# Patient Record
Sex: MALE | Race: BLACK OR AFRICAN AMERICAN | NOT HISPANIC OR LATINO | Employment: UNEMPLOYED | RURAL
[De-identification: names, ages, dates, MRNs, and addresses within clinical notes are randomized per-mention and may not be internally consistent; named-entity substitution may affect disease eponyms.]

---

## 2024-03-10 ENCOUNTER — HOSPITAL ENCOUNTER (OUTPATIENT)
Facility: HOSPITAL | Age: 29
Discharge: HOME | End: 2024-03-10
Attending: EMERGENCY MEDICINE | Admitting: ORTHOPAEDIC SURGERY
Payer: COMMERCIAL

## 2024-03-10 ENCOUNTER — ANESTHESIA EVENT (OUTPATIENT)
Dept: SURGERY | Facility: HOSPITAL | Age: 29
End: 2024-03-10
Payer: COMMERCIAL

## 2024-03-10 ENCOUNTER — ANESTHESIA (OUTPATIENT)
Dept: SURGERY | Facility: HOSPITAL | Age: 29
End: 2024-03-10
Payer: COMMERCIAL

## 2024-03-10 VITALS
BODY MASS INDEX: 29.8 KG/M2 | WEIGHT: 220 LBS | OXYGEN SATURATION: 99 % | RESPIRATION RATE: 18 BRPM | HEIGHT: 72 IN | DIASTOLIC BLOOD PRESSURE: 79 MMHG | SYSTOLIC BLOOD PRESSURE: 173 MMHG | TEMPERATURE: 97 F | HEART RATE: 72 BPM

## 2024-03-10 DIAGNOSIS — S62.314A CLOSED DISPLACED FRACTURE OF BASE OF FOURTH METACARPAL BONE OF RIGHT HAND, INITIAL ENCOUNTER: Primary | ICD-10-CM

## 2024-03-10 DIAGNOSIS — S63.266A CLOSED DISLOCATION OF FIFTH METACARPAL BONE OF RIGHT HAND: ICD-10-CM

## 2024-03-10 PROCEDURE — 96374 THER/PROPH/DIAG INJ IV PUSH: CPT

## 2024-03-10 PROCEDURE — 37000008 HC ANESTHESIA 1ST 15 MINUTES: Performed by: ORTHOPAEDIC SURGERY

## 2024-03-10 PROCEDURE — 99285 EMERGENCY DEPT VISIT HI MDM: CPT | Mod: 25

## 2024-03-10 PROCEDURE — 63600175 PHARM REV CODE 636 W HCPCS: Performed by: ANESTHESIOLOGY

## 2024-03-10 PROCEDURE — 36000706: Performed by: ORTHOPAEDIC SURGERY

## 2024-03-10 PROCEDURE — 71000033 HC RECOVERY, INTIAL HOUR: Performed by: ORTHOPAEDIC SURGERY

## 2024-03-10 PROCEDURE — D9220A PRA ANESTHESIA: Mod: ,,, | Performed by: ANESTHESIOLOGY

## 2024-03-10 PROCEDURE — 99222 1ST HOSP IP/OBS MODERATE 55: CPT | Mod: 57,,, | Performed by: ORTHOPAEDIC SURGERY

## 2024-03-10 PROCEDURE — C1713 ANCHOR/SCREW BN/BN,TIS/BN: HCPCS | Performed by: ORTHOPAEDIC SURGERY

## 2024-03-10 PROCEDURE — 36000707: Performed by: ORTHOPAEDIC SURGERY

## 2024-03-10 PROCEDURE — 26608 TREAT METACARPAL FRACTURE: CPT | Mod: RT,,, | Performed by: ORTHOPAEDIC SURGERY

## 2024-03-10 PROCEDURE — 25000003 PHARM REV CODE 250: Performed by: ANESTHESIOLOGY

## 2024-03-10 PROCEDURE — 37000009 HC ANESTHESIA EA ADD 15 MINS: Performed by: ORTHOPAEDIC SURGERY

## 2024-03-10 PROCEDURE — 99285 EMERGENCY DEPT VISIT HI MDM: CPT | Mod: ,,, | Performed by: EMERGENCY MEDICINE

## 2024-03-10 PROCEDURE — 96375 TX/PRO/DX INJ NEW DRUG ADDON: CPT

## 2024-03-10 PROCEDURE — 63600175 PHARM REV CODE 636 W HCPCS: Performed by: EMERGENCY MEDICINE

## 2024-03-10 RX ORDER — SODIUM CHLORIDE 0.9 % (FLUSH) 0.9 %
2 SYRINGE (ML) INJECTION EVERY 8 HOURS PRN
Status: DISCONTINUED | OUTPATIENT
Start: 2024-03-10 | End: 2024-03-10 | Stop reason: HOSPADM

## 2024-03-10 RX ORDER — ONDANSETRON HYDROCHLORIDE 2 MG/ML
4 INJECTION, SOLUTION INTRAVENOUS DAILY PRN
Status: DISCONTINUED | OUTPATIENT
Start: 2024-03-10 | End: 2024-03-10 | Stop reason: HOSPADM

## 2024-03-10 RX ORDER — ATROPINE SULFATE 0.4 MG/ML
INJECTION, SOLUTION ENDOTRACHEAL; INTRAMEDULLARY; INTRAMUSCULAR; INTRAVENOUS; SUBCUTANEOUS
Status: DISCONTINUED | OUTPATIENT
Start: 2024-03-10 | End: 2024-03-10

## 2024-03-10 RX ORDER — HYDROMORPHONE HYDROCHLORIDE 2 MG/ML
1 INJECTION, SOLUTION INTRAMUSCULAR; INTRAVENOUS; SUBCUTANEOUS
Status: COMPLETED | OUTPATIENT
Start: 2024-03-10 | End: 2024-03-10

## 2024-03-10 RX ORDER — HYDROCHLOROTHIAZIDE 25 MG/1
25 TABLET ORAL DAILY
COMMUNITY

## 2024-03-10 RX ORDER — PROPOFOL 10 MG/ML
VIAL (ML) INTRAVENOUS
Status: DISCONTINUED | OUTPATIENT
Start: 2024-03-10 | End: 2024-03-10

## 2024-03-10 RX ORDER — ONDANSETRON 4 MG/1
8 TABLET, ORALLY DISINTEGRATING ORAL EVERY 8 HOURS PRN
Status: DISCONTINUED | OUTPATIENT
Start: 2024-03-10 | End: 2024-03-10 | Stop reason: HOSPADM

## 2024-03-10 RX ORDER — OXYCODONE HYDROCHLORIDE 5 MG/1
5 TABLET ORAL
Status: DISCONTINUED | OUTPATIENT
Start: 2024-03-10 | End: 2024-03-10 | Stop reason: HOSPADM

## 2024-03-10 RX ORDER — DIPHENHYDRAMINE HYDROCHLORIDE 50 MG/ML
25 INJECTION INTRAMUSCULAR; INTRAVENOUS EVERY 6 HOURS PRN
Status: DISCONTINUED | OUTPATIENT
Start: 2024-03-10 | End: 2024-03-10 | Stop reason: HOSPADM

## 2024-03-10 RX ORDER — MEPERIDINE HYDROCHLORIDE 25 MG/ML
25 INJECTION INTRAMUSCULAR; INTRAVENOUS; SUBCUTANEOUS EVERY 10 MIN PRN
Status: DISCONTINUED | OUTPATIENT
Start: 2024-03-10 | End: 2024-03-10 | Stop reason: HOSPADM

## 2024-03-10 RX ORDER — LIDOCAINE HYDROCHLORIDE 20 MG/ML
INJECTION, SOLUTION EPIDURAL; INFILTRATION; INTRACAUDAL; PERINEURAL
Status: DISCONTINUED | OUTPATIENT
Start: 2024-03-10 | End: 2024-03-10

## 2024-03-10 RX ORDER — FLUCONAZOLE 150 MG/1
150 TABLET ORAL DAILY
COMMUNITY

## 2024-03-10 RX ORDER — FENTANYL CITRATE 50 UG/ML
INJECTION, SOLUTION INTRAMUSCULAR; INTRAVENOUS
Status: DISCONTINUED | OUTPATIENT
Start: 2024-03-10 | End: 2024-03-10

## 2024-03-10 RX ORDER — HYDROMORPHONE HYDROCHLORIDE 2 MG/ML
0.5 INJECTION, SOLUTION INTRAMUSCULAR; INTRAVENOUS; SUBCUTANEOUS EVERY 5 MIN PRN
Status: DISCONTINUED | OUTPATIENT
Start: 2024-03-10 | End: 2024-03-10 | Stop reason: HOSPADM

## 2024-03-10 RX ORDER — OXYCODONE HYDROCHLORIDE 5 MG/1
5 TABLET ORAL EVERY 4 HOURS PRN
Status: DISCONTINUED | OUTPATIENT
Start: 2024-03-10 | End: 2024-03-10 | Stop reason: HOSPADM

## 2024-03-10 RX ORDER — CEFAZOLIN SODIUM 1 G/3ML
INJECTION, POWDER, FOR SOLUTION INTRAMUSCULAR; INTRAVENOUS
Status: DISCONTINUED | OUTPATIENT
Start: 2024-03-10 | End: 2024-03-10

## 2024-03-10 RX ORDER — SODIUM CHLORIDE, SODIUM LACTATE, POTASSIUM CHLORIDE, CALCIUM CHLORIDE 600; 310; 30; 20 MG/100ML; MG/100ML; MG/100ML; MG/100ML
125 INJECTION, SOLUTION INTRAVENOUS CONTINUOUS
Status: DISCONTINUED | OUTPATIENT
Start: 2024-03-10 | End: 2024-03-10 | Stop reason: HOSPADM

## 2024-03-10 RX ORDER — ONDANSETRON HYDROCHLORIDE 2 MG/ML
4 INJECTION, SOLUTION INTRAVENOUS
Status: COMPLETED | OUTPATIENT
Start: 2024-03-10 | End: 2024-03-10

## 2024-03-10 RX ORDER — LOSARTAN POTASSIUM 100 MG/1
100 TABLET ORAL DAILY
COMMUNITY

## 2024-03-10 RX ORDER — AMLODIPINE BESYLATE 10 MG/1
10 TABLET ORAL DAILY
COMMUNITY

## 2024-03-10 RX ORDER — MORPHINE SULFATE 10 MG/ML
4 INJECTION INTRAMUSCULAR; INTRAVENOUS; SUBCUTANEOUS EVERY 5 MIN PRN
Status: DISCONTINUED | OUTPATIENT
Start: 2024-03-10 | End: 2024-03-10 | Stop reason: HOSPADM

## 2024-03-10 RX ORDER — IBUPROFEN 600 MG/1
600 TABLET ORAL 3 TIMES DAILY
Qty: 60 TABLET | Refills: 2 | Status: SHIPPED | OUTPATIENT
Start: 2024-03-10

## 2024-03-10 RX ORDER — MUPIROCIN 20 MG/G
OINTMENT TOPICAL 2 TIMES DAILY
Status: DISCONTINUED | OUTPATIENT
Start: 2024-03-10 | End: 2024-03-10 | Stop reason: HOSPADM

## 2024-03-10 RX ORDER — HYDROMORPHONE HYDROCHLORIDE 2 MG/ML
1 INJECTION, SOLUTION INTRAMUSCULAR; INTRAVENOUS; SUBCUTANEOUS EVERY 4 HOURS PRN
Status: DISCONTINUED | OUTPATIENT
Start: 2024-03-10 | End: 2024-03-10 | Stop reason: HOSPADM

## 2024-03-10 RX ADMIN — ATROPINE SULFATE 0.2 MG: 0.4 INJECTION, SOLUTION INTRAMUSCULAR; INTRAVENOUS; SUBCUTANEOUS at 05:03

## 2024-03-10 RX ADMIN — ONDANSETRON 4 MG: 2 INJECTION INTRAMUSCULAR; INTRAVENOUS at 03:03

## 2024-03-10 RX ADMIN — PROPOFOL 150 MG: 10 INJECTION, EMULSION INTRAVENOUS at 05:03

## 2024-03-10 RX ADMIN — FENTANYL CITRATE 100 MCG: 50 INJECTION INTRAMUSCULAR; INTRAVENOUS at 05:03

## 2024-03-10 RX ADMIN — HYDROMORPHONE HYDROCHLORIDE 1 MG: 2 INJECTION, SOLUTION INTRAMUSCULAR; INTRAVENOUS; SUBCUTANEOUS at 03:03

## 2024-03-10 RX ADMIN — LIDOCAINE HYDROCHLORIDE 40 MG: 20 INJECTION, SOLUTION INTRAVENOUS at 05:03

## 2024-03-10 RX ADMIN — CEFAZOLIN 2 G: 1 INJECTION, POWDER, FOR SOLUTION INTRAMUSCULAR; INTRAVENOUS; PARENTERAL at 05:03

## 2024-03-10 NOTE — ED PROVIDER NOTES
Encounter Date: 3/10/2024    SCRIBE #1 NOTE: I, Tammy Bryant, am scribing for, and in the presence of,  Caesar Sanches MD. I have scribed the entire note.       History     Chief Complaint   Patient presents with    Hand Injury     28 year old male presents to the ED via [facility] complaining of hand injury. Patient says that he punched a window with his right hand due to being mad at his roommate. He now reports pain to his right hand with swelling and tenderness. No other complaints at this time.     The history is provided by the patient. No  was used.     Review of patient's allergies indicates:  No Known Allergies  Past Medical History:   Diagnosis Date    Hypertension      Past Surgical History:   Procedure Laterality Date    CLOSED REDUCTION OF FRACTURE OF HAND WITH PERCUTANEOUS PINNING Right 3/10/2024    Procedure: CLOSED REDUCTION, FRACTURE, HAND, WITH PERCUTANEOUS PINNING;  Surgeon: Omar Valerio MD;  Location: [facility];  Service: Orthopedics;  Laterality: Right;     History reviewed. No pertinent family history.     Review of Systems   Musculoskeletal:         Right hand pain.    All other systems reviewed and are negative.      Physical Exam     Initial Vitals [03/10/24 1349]   BP Pulse Resp Temp SpO2   (!) 168/98 64 16 97.8 °F (36.6 °C) 100 %      MAP       --         Physical Exam    Nursing note and vitals reviewed.  Eyes: Conjunctivae are normal. Pupils are equal, round, and reactive to light.   Neck:   Normal range of motion.  Cardiovascular:  Regular rhythm and normal heart sounds.           Pulmonary/Chest: Breath sounds normal.   Musculoskeletal:         General: Tenderness present.      Cervical back: Normal range of motion.      Comments: Deformity of dorsum of right hand with swelling and tenderness.      Neurological: He is alert and oriented to person, place, and time. He has normal strength and normal reflexes.         ED Course   Procedures  Labs Reviewed - No  data to display       Imaging Results              X-Ray Hand 3 View Right (Final result)  Result time 03/10/24 18:49:54      Final result by Kenneth King DO (03/10/24 18:49:54)                   Impression:      Anatomic alignment of the right hand. Metallic pins extend through the 3rd, 4th and 5th metacarpal bases.      Electronically signed by: Kenneth King  Date:    03/10/2024  Time:    18:49               Narrative:    EXAMINATION:  XR HAND COMPLETE 3 VIEW RIGHT    CLINICAL HISTORY:  Postoperative    TECHNIQUE:  XR HAND COMPLETE 3 VIEW RIGHT    COMPARISON:  3/10/24    FINDINGS:  Anatomic alignment of the right hand.  Metallic pins extend through the 3rd, 4th and 5th metacarpal bases.                                       X-Ray Hand 3 view Right (Edited Result - FINAL)  Result time 03/10/24 14:09:41      Addendum (preliminary) 1 of 1 by Kenneth King DO (03/10/24 14:09:41)      Posterior dislocation of the proximal aspect of the fourth and fifth metacarpals.      Electronically signed by: Kenneth King  Date:    03/10/2024  Time:    14:09                 Final result by Kenneth King DO (03/10/24 14:03:00)                   Impression:      Small bony fragment inferior to the 4th or 5th ray, probably avulsion fragment.    Posterior dislocation of the proximal aspects of the 4th and 5th metatarsals      Electronically signed by: Kenneth King  Date:    03/10/2024  Time:    14:03               Narrative:    EXAMINATION:  XR HAND COMPLETE 3 VIEW RIGHT    CLINICAL HISTORY:  injury;    TECHNIQUE:  XR HAND COMPLETE 3 VIEW RIGHT    COMPARISON:  None    FINDINGS:  Small bony fragment inferior to the 4th or 5th ray, probably avulsion fragment.    Posterior dislocation of the proximal aspects of the 4th and 5th metatarsals    No radiopaque foreign bodies.    Soft tissue swelling at the dorsum of the hand.                                       Medications   ondansetron injection 4 mg (4 mg  Intravenous Given 3/10/24 1506)   HYDROmorphone (PF) injection 1 mg (1 mg Intravenous Given 3/10/24 1506)     Medical Decision Making  HAND INJURY\    DDX:  FRACTURE VS DISLOCATION VS BOTH    SURGERY    Amount and/or Complexity of Data Reviewed  Radiology: ordered. Decision-making details documented in ED Course.    Risk  Prescription drug management.              Attending Attestation:           Physician Attestation for Scribe:  Physician Attestation Statement for Scribe #1: I, Caesar Sanches MD, reviewed documentation, as scribed by Tammy Hurtado in my presence, and it is both accurate and complete.                                    Clinical Impression:  Final diagnoses:  [S63.266A] Closed dislocation of fifth metacarpal bone of right hand          ED Disposition Condition    Admit Stable                Caesar Sanches MD  03/26/24 0942

## 2024-03-10 NOTE — ANESTHESIA PREPROCEDURE EVALUATION
03/10/2024  Arnie Vines is a 28 y.o., male.      Pre-op Assessment    I have reviewed the Patient Summary Reports.     I have reviewed the Nursing Notes. I have reviewed the NPO Status.   I have reviewed the Medications.     Review of Systems  Anesthesia Hx:  No problems with previous Anesthesia                Social:  Non-Smoker, No Alcohol Use       Hematology/Oncology:  Hematology Normal   Oncology Normal                                   EENT/Dental:  EENT/Dental Normal           Cardiovascular:     Hypertension                                        Pulmonary:  Pulmonary Normal                       Renal/:  Renal/ Normal                 Hepatic/GI:  Hepatic/GI Normal                 Musculoskeletal:  Musculoskeletal Normal    Rt metacarpal  fx            Neurological:  Neurology Normal                                      Endocrine:  Endocrine Normal            Dermatological:  Skin Normal    Psych:  Psychiatric Normal                    Physical Exam  General: Well nourished    Airway:  Mallampati: II / II  Mouth Opening: Normal  TM Distance: > 6 cm  Tongue: Normal  Neck ROM: Normal ROM    Chest/Lungs:  Clear to auscultation, Normal Respiratory Rate    Heart:  Rate: Normal  Rhythm: Regular Rhythm        Anesthesia Plan  Type of Anesthesia, risks & benefits discussed:    Anesthesia Type: Gen Supraglottic Airway  Intra-op Monitoring Plan: Standard ASA Monitors  Post Op Pain Control Plan: multimodal analgesia  Induction:  IV  Informed Consent: Informed consent signed with the Patient and all parties understand the risks and agree with anesthesia plan.  All questions answered. Patient consented to blood products? Yes  ASA Score: 2 Emergent  Day of Surgery Review of History & Physical: H&P Update referred to the surgeon/provider.I have interviewed and examined the patient. I have reviewed the  patient's H&P dated: There are no significant changes.     Ready For Surgery From Anesthesia Perspective.     .

## 2024-03-10 NOTE — PLAN OF CARE
1810 pt to pacu pt resp reg and non labored pt sleepy pt awakens easily pt sao2 100% on ra pt with cast intact to r hand fingers with good color cap refill less than 3 sec will monitor     1820 pt vs stable pt resting well pt voices no complaints pt moving fingers well to r hand pt states no numbness or tingling    1840 pt vs stable pt resting well pt sao2 100% on ra pt cast intact to r hand pt pain level 0 at present orders to release to room per md ko present for transport    1845 pt released to evelyne hayes rn b/p 173/79 pulse 72 resp 18 sao2 99% on ra temp 97.4 oral  Pt awake and alert voices no complaints

## 2024-03-10 NOTE — OP NOTE
[facility] - Orthopedic Periop Services  Operative Note    Surgery Date: 3/10/2024      Surgeon(s) and Role:     * Omar Valerio MD - Primary    Assistant: Hardik Mireles    Pre-op Diagnosis:   Closed displaced fracture of base of fourth metacarpal bone of right hand, initial encounter [S62.314A]     Post-op Diagnosis:  Post-Op Diagnosis Codes:     * Closed displaced fracture of base of fourth metacarpal bone of right hand, initial encounter [S62.314A]     Procedure:  Closed reduction and percutaneous pinning of right 5th and 4th carpometacarpal joints  Anesthesia:  lma    EBL:  1CC    Implants:   Implant Name Type Inv. Item Serial No.  Lot No. LRB No. Used Action   k-wire      Right 1 Implanted       Tourniquet time: 0 mins    Complication:   none    Procedure: The patient was taken to the operating room and placedsupine.  Anesthesia was administered and pre-operative antibiotics were given.  A timeout was performed.  Patient was positioned appropriately and prepped and draped in the usual sterile fashion.    AFTER THE PATIENT WAS anesthetized, I then reduced the 4th and 5th carpometacarpal fracture dislocation.  This was confirmed under fluoroscopic imaging.  I then utilized 064 mm K-wires and from an ulnar to radial direction I inserted these through the base of the 5th metacarpal into the 4th metacarpal into the 3rd metacarpal.  This process was repeated in the ends of the wires were then clipped just to the edge of the skin.  Excellent stability was able to be afforded.  I then applied a well-molded short-arm fiberglass cast and this completed the procedure.  Excellent reduction was confirmed under fluoroscopic imaging    Disposition:awakened from anesthesia, extubated and taken to the recovery room in a stable condition, having suffered no apparent untoward event.

## 2024-03-10 NOTE — H&P
[facility] - Emergency Department  Orthopedics  H&P    Patient Name: Arnie Vines  MRN: [id number]  Admission Date: 3/10/2024  Primary Care Provider: No primary care provider on file.    Patient information was obtained from patient and ER records.     Subjective:     Principal Problem:<principal problem not specified>    Chief Complaint:   Chief Complaint   Patient presents with    Hand Injury        HPI:  Year old male who presents emergency department after striking a window with his fist sustaining a 4th metacarpal carpal joint dislocation as well as a 5th carpometacarpal joint dislocation.  This is a closed injury ortho was consulted.  Denies any other associated injuries.    Past Medical History:   Diagnosis Date    Hypertension        No past surgical history on file.    Review of patient's allergies indicates:  No Known Allergies    No current facility-administered medications for this encounter.     Current Outpatient Medications   Medication Sig    amLODIPine (NORVASC) 10 MG tablet Take 10 mg by mouth once daily.    fluconazole (DIFLUCAN) 150 MG Tab Take 150 mg by mouth once daily.    hydroCHLOROthiazide (HYDRODIURIL) 25 MG tablet Take 25 mg by mouth once daily.    losartan (COZAAR) 100 MG tablet Take 100 mg by mouth once daily.     Family History    None       Tobacco Use    Smoking status: Not on file    Smokeless tobacco: Not on file   Substance and Sexual Activity    Alcohol use: Not on file    Drug use: Not on file    Sexual activity: Not on file     ROS  Objective:     Vital Signs (Most Recent):  Temp: 97.8 °F (36.6 °C) (03/10/24 1349)  Pulse: 64 (03/10/24 1349)  Resp: 18 (03/10/24 1506)  BP: (!) 168/98 (03/10/24 1349)  SpO2: 100 % (03/10/24 1349) Vital Signs (24h Range):  Temp:  [97.8 °F (36.6 °C)] 97.8 °F (36.6 °C)  Pulse:  [64] 64  Resp:  [16-18] 18  SpO2:  [100 %] 100 %  BP: (168)/(98) 168/98     Weight: 99.8 kg (220 lb)  Height: 6' (182.9 cm)  Body mass index is 29.84 kg/m².    No  intake or output data in the 24 hours ending 03/10/24 1716    Ortho/SPM Exam  On exam he has marked swelling present on the base of the 5th and 4th metacarpal of the right hand with a deformity consistent with a dislocation at the carpometacarpal joint of the 4th and 5th digits he is neurovascularly intact.  Limited range of motion of the right hand on exam  Significant Labs:   Recent Lab Results       None          All pertinent labs within the past 24 hours have been reviewed.    Significant Imaging: I have reviewed and interpreted all pertinent imaging results/findings.  X-Ray Hand 3 view Right    Addendum Date: 3/10/2024    Posterior dislocation of the proximal aspect of the fourth and fifth metacarpals. Electronically signed by: Kenneth King Date:    03/10/2024 Time:    14:09    Result Date: 3/10/2024  EXAMINATION: XR HAND COMPLETE 3 VIEW RIGHT CLINICAL HISTORY: injury; TECHNIQUE: XR HAND COMPLETE 3 VIEW RIGHT COMPARISON: None FINDINGS: Small bony fragment inferior to the 4th or 5th ray, probably avulsion fragment. Posterior dislocation of the proximal aspects of the 4th and 5th metatarsals No radiopaque foreign bodies. Soft tissue swelling at the dorsum of the hand.     Small bony fragment inferior to the 4th or 5th ray, probably avulsion fragment. Posterior dislocation of the proximal aspects of the 4th and 5th metatarsals Electronically signed by: Kenneth King Date:    03/10/2024 Time:    14:03       Assessment/Plan:     There are no hospital problems to display for this patient.      Plan for closed reduction percutaneous pinning of right 4th and 5th MCP joint dislocation.  Risks benefits alternatives were discussed.  Patient voiced understanding.  Omar Valerio MD  Orthopedics  [facility] - Emergency Department

## 2024-03-11 NOTE — NURSING
IV removed. Patient leaving the floor with 2 security guards from senior care. Patient and security guards being escorted out by [facility] .

## 2024-03-11 NOTE — ED NOTES
Faxed chart to [facility] Fax number [phone] @[phone]. Facility did not receive any paperwork concerning patients cast and care from here onward

## 2024-03-12 NOTE — DISCHARGE SUMMARY
[facility] - Orthopedic  Discharge Note  Short Stay    Procedure(s) (LRB):  CLOSED REDUCTION, FRACTURE, HAND, WITH PERCUTANEOUS PINNING (Right)      OUTCOME: Patient tolerated treatment/procedure well without complication and is now ready for discharge.    DISPOSITION: Home or Self Care    FINAL DIAGNOSIS:  <principal problem not specified>    FOLLOWUP: In clinic    DISCHARGE INSTRUCTIONS:    Discharge Procedure Orders   Diet general     Keep surgical extremity elevated     Leave dressing on - Keep it clean, dry, and intact until clinic visit     Call MD for:  temperature >100.4     Call MD for:  persistent nausea and vomiting     Call MD for:  severe uncontrolled pain     Call MD for:  difficulty breathing, headache or visual disturbances     Call MD for:  redness, tenderness, or signs of infection (pain, swelling, redness, odor or green/yellow discharge around incision site)     Call MD for:  hives     Call MD for:  persistent dizziness or light-headedness     Call MD for:  extreme fatigue        TIME SPENT ON DISCHARGE: 9 minutes

## 2024-03-26 PROBLEM — S63.266A CLOSED DISLOCATION OF FIFTH METACARPAL BONE OF RIGHT HAND: Status: ACTIVE | Noted: 2024-03-26

## 2024-05-06 ENCOUNTER — TELEPHONE (OUTPATIENT)
Dept: ORTHOPEDICS | Facility: CLINIC | Age: 29
End: 2024-05-06
Payer: COMMERCIAL

## 2024-05-06 NOTE — TELEPHONE ENCOUNTER
----- Message from Liliya Feliciano sent at 5/6/2024  9:59 AM CDT -----  [facility] calling for a  follow up appt. Pt had surg for wrist fx on 03/10 by Dr Valerio. Still has cast on, did not have a follow up appt. Please call Nurse Acosta at [phone].

## 2024-05-07 DIAGNOSIS — Z47.89 ORTHOPEDIC AFTERCARE: Primary | ICD-10-CM

## 2024-05-08 ENCOUNTER — HOSPITAL ENCOUNTER (OUTPATIENT)
Dept: RADIOLOGY | Facility: HOSPITAL | Age: 29
Discharge: HOME | End: 2024-05-08
Attending: NURSE PRACTITIONER
Payer: COMMERCIAL

## 2024-05-08 ENCOUNTER — OFFICE VISIT (OUTPATIENT)
Dept: ORTHOPEDICS | Facility: CLINIC | Age: 29
End: 2024-05-08
Payer: COMMERCIAL

## 2024-05-08 DIAGNOSIS — S62.314A CLOSED DISPLACED FRACTURE OF BASE OF FOURTH METACARPAL BONE OF RIGHT HAND, INITIAL ENCOUNTER: ICD-10-CM

## 2024-05-08 DIAGNOSIS — Z47.89 ORTHOPEDIC AFTERCARE: Primary | ICD-10-CM

## 2024-05-08 DIAGNOSIS — Z47.89 ORTHOPEDIC AFTERCARE: ICD-10-CM

## 2024-05-08 PROCEDURE — 99024 POSTOP FOLLOW-UP VISIT: CPT | Mod: ,,, | Performed by: NURSE PRACTITIONER

## 2024-05-08 PROCEDURE — 73130 X-RAY EXAM OF HAND: CPT | Mod: 26,RT,, | Performed by: STUDENT IN AN ORGANIZED HEALTH CARE EDUCATION/TRAINING PROGRAM

## 2024-05-08 PROCEDURE — 99212 OFFICE O/P EST SF 10 MIN: CPT | Mod: PBBFAC,25 | Performed by: NURSE PRACTITIONER

## 2024-05-08 PROCEDURE — 73130 X-RAY EXAM OF HAND: CPT | Mod: TC,RT

## 2024-05-08 NOTE — PROGRESS NOTES
HISTORY OF PRESENT ILLNESS:       Closed Reduction, Fracture, Hand, With Percutaneous Pinning - Right 3/10/2024       Pt is here today for First post-operative followup of his No surgery found.      he is doing well.    Patient is 8 1/2 weeks post op and has been in a hard cast post surgery.  This is his 1st time to be back since his surgery on March 10, 2020 4.  He reports he assumes they forgot about his appointment.  [facility].  Cast removed today.  No signs of infection to an pin sites.  Pins pull today.  Pin sites cleaned.    He has not had a follow-up appointment post-surgery until today.     We have reviewed his findings and discussed plan of care and future treatment options, including the physical therapy plan.                                                                                    PHYSICAL EXAMINATION:     Incision sites were inspected today.  There is no evidence of any erythema, infection or induration  Minimal effusion is present.  Patient is neurovascularly intact.   2+ radial and ulnar pulse pulses.        Imaging:     EXAMINATION:  XR HAND COMPLETE 3 VIEW RIGHT     CLINICAL HISTORY:  Encounter for other orthopedic aftercare     TECHNIQUE:  XR HAND COMPLETE 3 VIEW RIGHT     COMPARISON:  3/10/247     FINDINGS:  Metallic pins extending through the 3rd, 4th and 5th metacarpals bases, similar.     Anatomic alignment of the bases of the 3rd, 4th and 5th metacarpals.     No radiopaque foreign bodies.     Impression:     Metallic pins extending through the 3rd, 4th and 5th metacarpals bases, similar.     Anatomic alignment of the bases of the 3rd, 4th and 5th metacarpals.        Electronically signed by:Kenneth King  Date:                                            05/08/2024  Time:                                           12:46           Exam Ended: 05/08/24 11:45 CDT Last Resulted: 05/08/24 12:46 CDT             No results found.                                                                                  ASSESSMENT:                                                                                                                                               1. Status post above, doing well.                                                                                                                               PLAN:       Wounds were treated today and a discussion of weight-bearing status was had with the patient.    Therapy plan discussed in great detail today; all questions answered.   Home exercises were prescribed           Pins pull today.  Pin sites cleaned.  Instructed on wound care.  Return to clinic 4 weeks with Dr. Valerio.                                                                                                                                   There are no Patient Instructions on file for this visit.

## 2024-06-03 DIAGNOSIS — S62.314A CLOSED DISPLACED FRACTURE OF BASE OF FOURTH METACARPAL BONE OF RIGHT HAND, INITIAL ENCOUNTER: Primary | ICD-10-CM

## 2024-08-05 DIAGNOSIS — Z47.89 ORTHOPEDIC AFTERCARE: Primary | ICD-10-CM

## 2025-06-20 NOTE — ANESTHESIA RELEASE NOTE
Anesthesia Release from PACU Note    Patient: Arnie Vines    Procedure(s) Performed: Procedure(s) (LRB):  CLOSED REDUCTION, FRACTURE, HAND, WITH PERCUTANEOUS PINNING (Right)    Anesthesia type: general    Post pain: Adequate analgesia    Post assessment: no apparent anesthetic complications    Last Vitals: Visit Vitals  BP (!) 168/98   Pulse 64   Temp 36.6 °C (97.8 °F) (Oral)   Resp 18   Ht 6' (1.829 m)   Wt 99.8 kg (220 lb)   SpO2 100%   BMI 29.84 kg/m²       Post vital signs: stable    Level of consciousness: awake    Nausea/Vomiting: no nausea/no vomiting    Complications: none    Airway Patency: patent    Respiratory: unassisted    Cardiovascular: stable and blood pressure at baseline    Hydration: euvolemic   MWL INITIAL ASSESSMENT    Nutrition Assessment:  Anthropometrics:    Ht: 5’4”, wt: 281#, 234% IBW, 47.58 BMI    Macronutrient needs assessment   EER: 3340-1555 kcal/d (14-16 kcal/kg ABW)    MSJ x 1.3-500 = 1900 kcal/d (sedentary AF)   EPR: 55-82g pro (1.0-1.5 gm pro/kg IBW)   CHO: ~237 g CHO/d (50% EER, ~5 servings CHO/meal)   H2O: 1mL/kcal or per MD recommendations     Support:  Pt has told family - to be good support.  Reminded pt about [facility] support group and encouraged attendance.      Motivation:  High. Reports 15+ previous weight loss attempts including medication (supplements) and self-supervised diets with exercise.     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   Restaurant/Fast Food Intake: 2-3 x weekly   Food Allergies: None   Cultural Preferences: None   Typical Beverage Consumption: Water and juice   Diet Recall:   Breakfast: Protein shake  Lunch: Cheese cubes, eggs, grapes  Dinner: Soup    Lifestyle Assessment:    Hours of sleep/night: ~at least 6 hours   Current Occupation: Patient    Activity during day: Sedentary    Number of people living in house and influence: Pt lives alone    Exercise Assessment:   PAR-Q: No contraindication to exercise   Medical:     Pain or injuries (present): None     Past surgeries related to mobility: Bilateral knee replacement, achilles heel sx, bilateral shoulder sx  Exercise equipment at home: None   Gym Membership: [employer]  Current Exercise Routine: Walking 3-4x weekly   Assessment Exercise Goal: Continue current exercise and increase as able    Nutrition Diagnosis:  Morbid obesity R/T excessive energy intake and yoyo dieting as evidenced by BMI = 47.58 and 234 % IBW.      Nutrition Intervention:   Diet Rx - Low-moderate calorie intake (~1700 kcal/day).  Work on eating 3 meals/d and meal balance.   Modify distribution, type or amount of food and nutrients within meals or at a specified time-  history, performing a physical exam and evaluation, documenting clinical information in the EHR, independently interpreting results, communicating results to patient, family or caregiver, and/or coordinating care.

## (undated) DEVICE — BAG RECTANGLE RBBRBND 30X36IN

## (undated) DEVICE — DRAPE U SPLIT SHEET 54X76IN

## (undated) DEVICE — APPLICATOR CHLORAPREP ORN 26ML

## (undated) DEVICE — GLOVE BIOGEL SKINSENSE PI 7.0

## (undated) DEVICE — Device

## (undated) DEVICE — SOL NACL IRR 1000ML BTL

## (undated) DEVICE — GLOVE BIOGEL SKINSENSE PI 6.5

## (undated) DEVICE — CAST SMALL OR FROM CAST CART

## (undated) DEVICE — PADDING WYTEX UNDRCST 2INX4YD

## (undated) DEVICE — GOWN POLY REINF BRTH SLV XL

## (undated) DEVICE — GLOVE 7.0 PROTEXIS PI BLUE

## (undated) DEVICE — GLOVE 8 PROTEXIS PI BLUE

## (undated) DEVICE — DRESSING XEROFORM NONADH 1X8IN

## (undated) DEVICE — TOURNIQUET SB QC DP 24X4IN